# Patient Record
Sex: FEMALE | Race: WHITE | Employment: OTHER | URBAN - METROPOLITAN AREA
[De-identification: names, ages, dates, MRNs, and addresses within clinical notes are randomized per-mention and may not be internally consistent; named-entity substitution may affect disease eponyms.]

---

## 2018-05-02 ENCOUNTER — NURSING HOME VISIT (OUTPATIENT)
Dept: GERIATRICS | Facility: CLINIC | Age: 83
End: 2018-05-02
Payer: COMMERCIAL

## 2018-05-02 VITALS
SYSTOLIC BLOOD PRESSURE: 153 MMHG | BODY MASS INDEX: 31.32 KG/M2 | OXYGEN SATURATION: 94 % | TEMPERATURE: 99.1 F | RESPIRATION RATE: 18 BRPM | WEIGHT: 170.2 LBS | HEIGHT: 62 IN | HEART RATE: 85 BPM | DIASTOLIC BLOOD PRESSURE: 81 MMHG

## 2018-05-02 DIAGNOSIS — D62 ACUTE BLOOD LOSS ANEMIA: ICD-10-CM

## 2018-05-02 DIAGNOSIS — I26.99 OTHER PULMONARY EMBOLISM WITHOUT ACUTE COR PULMONALE, UNSPECIFIED CHRONICITY (H): ICD-10-CM

## 2018-05-02 DIAGNOSIS — S12.601D CLOSED NONDISPLACED FRACTURE OF SEVENTH CERVICAL VERTEBRA WITH ROUTINE HEALING, UNSPECIFIED FRACTURE MORPHOLOGY, SUBSEQUENT ENCOUNTER: Primary | ICD-10-CM

## 2018-05-02 DIAGNOSIS — S62.102D CLOSED FRACTURE OF LEFT WRIST WITH ROUTINE HEALING, SUBSEQUENT ENCOUNTER: ICD-10-CM

## 2018-05-02 DIAGNOSIS — I10 ESSENTIAL HYPERTENSION: ICD-10-CM

## 2018-05-02 DIAGNOSIS — S70.02XD HEMATOMA OF LEFT HIP, SUBSEQUENT ENCOUNTER: ICD-10-CM

## 2018-05-02 DIAGNOSIS — Z79.01 ANTICOAGULATION MANAGEMENT ENCOUNTER: ICD-10-CM

## 2018-05-02 DIAGNOSIS — R53.81 PHYSICAL DECONDITIONING: ICD-10-CM

## 2018-05-02 DIAGNOSIS — Z85.3 HX: BREAST CANCER: ICD-10-CM

## 2018-05-02 DIAGNOSIS — Z51.81 ANTICOAGULATION MANAGEMENT ENCOUNTER: ICD-10-CM

## 2018-05-02 PROBLEM — S70.02XA HIP HEMATOMA, LEFT: Status: ACTIVE | Noted: 2018-05-02

## 2018-05-02 PROBLEM — S62.102A CLOSED FRACTURE OF LEFT WRIST: Status: ACTIVE | Noted: 2018-05-02

## 2018-05-02 PROCEDURE — 99309 SBSQ NF CARE MODERATE MDM 30: CPT | Performed by: NURSE PRACTITIONER

## 2018-05-02 RX ORDER — SENNOSIDES 8.6 MG
2 TABLET ORAL 2 TIMES DAILY PRN
COMMUNITY

## 2018-05-02 NOTE — PROGRESS NOTES
[place] [facility]  PRIMARY CARE PROVIDER AND CLINIC:  [facility] [address] [facility]  Chief Complaint   Patient presents with     Hospital F/U     [place] Medical Record Number:  [id number]    HPI:    Christina Naqvi is a 86 year old  (5/16/1931),admitted to the [facility] TCU from [facility] .  Hospital stay 4/27/18 through 5/1/18.  Admitted to this facility for  rehab, medical management and nursing care.  HPI information obtained from: facility chart records, facility staff, patient report, [facility] chart review and Care Everywhere [facility] chart review.  Current issues are:      Closed nondisplaced fracture of seventh cervical vertebra with routine healing, unspecified fracture morphology, subsequent encounter  Closed fracture of left wrist with routine healing, subsequent encounter  Hematoma of left hip, subsequent encounter  Patient transferred to TCU from [facility] after a fall at home. Patient fell down her basement stairs. She was anticoagulated at the time of the fall. She did strike her head. HCT was negative for bleeding.  She sustained a C7 vertebral body fracture, head laceration and left wrist fracture. She was placed in a [place] J collar, which she should wear at all times and a short arm splint. She also sustained a large left hip hematoma.   She is walking with a cane. She has some pain but does not want anything stronger at this point.   Acute blood loss anemia  hgb dropped from 14 to 10.2.     Other pulmonary embolism without acute cor pulmonale, unspecified chronicity (H)  Anticoagulation management encounter  S/p two PULMONARY EMBOLI in the past: 2009.   Coumadin on hold until 5/11.  Hypertension  BP's: 153/81, 135/67, 151/82- no change in meds.   HX: breast cancer  Patient was diagnosed with a right breast infiltrating ductal carcinoma,III,ER+ [place] + H2N - breast cancer in 8/21/17. She had lumpectomy and SLN  bx. On arimidex at home.     Physical deconditioning- lives alone in house. Has children in the area.   Has been independent.       CODE STATUS/ADVANCE DIRECTIVES DISCUSSION:   CPR/Full code   Patient's living condition: lives alone    ALLERGIES:Codeine; Ampicillin; and Penicillins  PAST MEDICAL HISTORY:  has a past medical history of Antiplatelet or antithrombotic long-term use; Coronary artery disease; Diverticulitis; Diverticulosis of colon (without mention of haemorrhage); Endometriosis; Personal history of colonic polyps; Pulmonary emboli (H); Pure hypercholesterolemia; Unspecified essential hypertension; and Unspecified glaucoma.  PAST SURGICAL HISTORY:  has a past surgical history that includes appendectomy; hernia repair; GI surgery; ENT surgery; GYN surgery; Abdomen surgery; and Mohs micrographic procedure (7/2/2013).  FAMILY HISTORY: family history is not on file.  SOCIAL HISTORY:  reports that she has never smoked. She does not have any smokeless tobacco history on file. She reports that she drinks alcohol. She reports that she does not use illicit drugs.    Post Discharge Medication Reconciliation Status: discharge medications reconciled, continue medications without change.  Current Outpatient Prescriptions   Medication Sig Dispense Refill     ACETAMINOPHEN PO Take 650 mg by mouth 3 times daily       AMLODIPINE BESYLATE PO Take 5 mg by mouth daily.       ANASTROZOLE PO Take 1 mg by mouth daily       COENZYME Q-10 PO Take 200 mg by mouth daily        Ezetimibe (ZETIA PO) Take 10 mg by mouth At Bedtime.       LISINOPRIL PO Take 40 mg by mouth daily       OMEGA-3 FATTY ACIDS PO Take 1,200 mg by mouth daily       Psyllium (METAMUCIL FIBER PO) Take 1 packet by mouth daily       QUETIAPINE FUMARATE PO Take 12.5 mg by mouth nightly as needed.       ROSUVASTATIN CALCIUM PO Take 5 mg by mouth three times a week Monday, Wednesday, friday       sennosides (SENOKOT) 8.6 MG tablet Take 2 tablets by mouth 2 times  "daily as needed for constipation         ROS:  4 point ROS including Respiratory, CV, GI and , other than that noted in the HPI,  is negative    Exam:  /81  Pulse 85  Temp 99.1  F (37.3  C)  Resp 18  Ht 5' 2\" (1.575 m)  Wt 170 lb 3.2 oz (77.2 kg)  SpO2 94%  BMI 31.13 kg/m2  GENERAL APPEARANCE:  Alert, in no distress  ENT:  Mouth and posterior oropharynx normal, moist mucous membranes, hearing acuity adequate   EYES:  EOM, conjunctivae, lids, pupils and irises normal  NECK:  No adenopathy,masses or thyromegaly  RESP:  respiratory effort and palpation of chest normal, no respiratory distress, Lung sounds clear  CV:  Palpation and auscultation of heart done , rate and rhythm reg, no murmur, no rub or gallop, Edema none  ABDOMEN:  normal bowel sounds, soft, nontender, no hepatosplenomegaly or other masses  M/S:   Gait and station antalgic. Left arm in splint., Digits and nails normal   SKIN:  Inspection/Palpation of skin and subcutaneous tissue ecchymosis over left hip to thigh.   NEURO: 2-12 in normal limits and at patient's baseline  PSYCH:  insight and judgement, memory intact , affect and mood normal      Lab/Diagnostic data:  [facility] labs:  PROTIME-INR (05/01/2018 8:12 AM)  PROTIME-INR (05/01/2018 8:12 AM)   Component Value Ref Range   INR 1.5 (H) <1.3   PROTIME 18.1 (H) 11.7 - 14.1 sec     Hemoglobin AM (05/01/2018 8:12 AM)  Hemoglobin AM (05/01/2018 8:12 AM)   Component Value Ref Range   HEMOGLOBIN  10.4 (L) 12.0 - 16.0 g/dL   MCV  95 80 - 100 fL     Basic 8 (04/27/2018 5:46 PM)  Basic 8 (04/27/2018 5:46 PM)   Component Value Ref Range   SODIUM 139 135 - 145 mmol/L   POTASSIUM 3.7 3.5 - 5.0 mmol/L   CHLORIDE 105 98 - 110 mmol/L   CO2,TOTAL 21 21 - 31 mmol/L   ANION GAP 13 5 - 18    GLUCOSE 117 (H) 65 - 100 mg/dL   CALCIUM 9.5 8.5 - 10.5 mg/dL   BUN 18 8 - 25 mg/dL   CREATININE 0.99 0.57 - 1.11 mg/dL   BUN/CREAT RATIO  18 10 - 20    GFR if African American >60 >60 ml/min/1.73m2   GFR if not  " American 53 (L) >60 ml/min/1.73m2     CBC (04/27/2018 5:46 PM)  CBC (04/27/2018 5:46 PM)   Component Value Ref Range   WHITE BLOOD COUNT  11.1 (H) 4.5 - 11.0 thou/cu mm   RED BLOOD COUNT  4.81 4.00 - 5.20 mil/cu mm   HEMOGLOBIN  14.8 12.0 - 16.0 g/dL   HEMATOCRIT  44.2 33.0 - 51.0 %   MCV  92 80 - 100 fL   MCH  30.8 26.0 - 34.0 pg   MCHC  33.5 32.0 - 36.0 g/dL   RDW  15.2 11.5 - 15.5 %   PLATELET COUNT  328 140 - 440 thou/cu mm   MPV  9.1 6.5 - 11.0 fL         ASSESSMENT/PLAN:  (S12.895R) Closed nondisplaced fracture of seventh cervical vertebra with routine healing, unspecified fracture morphology, subsequent encounter  (primary encounter diagnosis)  Comment: sustained after fall down steps. Some pain. Wearing [place] J collar.   Plan: acetaminophen for pain, physical therapy   Activity restrictions are:  1. Wear [place]-j collar at all times, except when eating or showering.   2. No weight bearing of the left upper extremity   3. Wear the splint on the left wrist, do not remove it, completely cover the splint when showering.    [facility]: Appointment with Dr Prashant Bains scheduled for 5/24/18.  (L76.926O) Closed fracture of left wrist with routine healing, subsequent encounter  Comment: non surgical repair. Splint in place. NWF to left wrist   Plan: follow up with ortho in one week. to assess healing and obtain x-rays to assess fracture site   [facility] Date: Thur May 10th Time:1015am Location:[address] [facility] [address] Transport: Daughter    (S70.02XD) Hematoma of left hip, subsequent encounter  Comment: due to being anticoagulated at time of fall. She does have pain. She is able to bear wt.   Plan: physical therapy     (D62) Acute blood loss anemia  Comment: due to hematoma on hip.   Plan: CBC 5/3    (I26.99) Other pulmonary embolism without acute cor pulmonale, unspecified chronicity (H)  (Z51.81,  Z79.01) Anticoagulation management encounter  Comment: warfarin on hold until 5/11  Plan: restart  coumadin on 5/11    (I10) Essential hypertension  Comment: adequate control. No recent change in meds  Plan: BMP 5/4    (Z85.3) HX: breast cancer  Comment: s/p lumpectomy.   Plan: continue anastrozole    (R53.81) Physical deconditioning  Comment: lives alone in house. Fell down basement stairs. She has been independent.   Plan: physical therapy and OCCUPATIONAL THERAPY.     Orders:  Activity restrictions are:  1. Wear [place]-j collar at all times, except when eating or showering.   2. No weight bearing of the left upper extremity   3. Wear the splint on the left wrist, do not remove it, completely cover the splint when showering.   Follow up recommendations:  1. [facility]: Appointment with Dr Prashant Bains scheduled for 5/24/18.   2. [facility] Patient needs to schedule a follow up appointment in 1 week to assess healing and obtain x-rays to assess fracture site with the .   3. PCP within 5 days of discharge for follow up on stability of hematoma, Hgb. Recommending hold warfarin for additional 10 days (? Start around 5/10/18) and then need to monitor for re-bleed. This start date may need to be adjusted if orthopedic surgery has plans for surgical repair of wrist in near future. PTA - INR goal: 2.0-3.0 (goal 2.5).  Follow-up with orthopedic surgery 1 week after discharge for new radiographs of the left wrist to ensure the fracture displacement has not occurred. If the fracture remains nondisplaced, consideration can be given at that time as an outpatient for short arm casting. She should be nonweightbearing on her left wrist.      Electronically signed by:  CECY Dobbins CNP

## 2018-05-02 NOTE — LETTER
5/2/2018        RE: Christina Naqvi  [address] [facility] [address]        [place] [facility]  PRIMARY CARE PROVIDER AND CLINIC:  [facility] [address] [facility]  Chief Complaint   Patient presents with     Hospital F/U     [place] Medical Record Number:  [id number]    HPI:    Christina Naqvi is a 86 year old  (5/16/1931),admitted to the [facility] from [facility] .  Hospital stay 4/27/18 through 5/1/18.  Admitted to this facility for  rehab, medical management and nursing care.  HPI information obtained from: facility chart records, facility staff, patient report, [facility] chart review and Care Everywhere [facility] chart review.  Current issues are:      Closed nondisplaced fracture of seventh cervical vertebra with routine healing, unspecified fracture morphology, subsequent encounter  Closed fracture of left wrist with routine healing, subsequent encounter  Hematoma of left hip, subsequent encounter  Patient transferred to TCU from [facility] after a fall at home. Patient fell down her basement stairs. She was anticoagulated at the time of the fall. She did strike her head. HCT was negative for bleeding.  She sustained a C7 vertebral body fracture, head laceration and left wrist fracture. She was placed in a [place] J collar, which she should wear at all times and a short arm splint. She also sustained a large left hip hematoma.   She is walking with a cane. She has some pain but does not want anything stronger at this point.   Acute blood loss anemia  hgb dropped from 14 to 10.2.     Other pulmonary embolism without acute cor pulmonale, unspecified chronicity (H)  Anticoagulation management encounter  S/p two PULMONARY EMBOLI in the past: 2009.   Coumadin on hold until 5/11.  Hypertension  BP's: 153/81, 135/67, 151/82- no change in meds.   HX: breast cancer  Patient was diagnosed with a right  breast infiltrating ductal carcinoma,III,ER+ [place] + H2N - breast cancer in 8/21/17. She had lumpectomy and SLN bx. On arimidex at home.     Physical deconditioning- lives alone in house. Has children in the area.   Has been independent.       CODE STATUS/ADVANCE DIRECTIVES DISCUSSION:   CPR/Full code   Patient's living condition: lives alone    ALLERGIES:Codeine; Ampicillin; and Penicillins  PAST MEDICAL HISTORY:  has a past medical history of Antiplatelet or antithrombotic long-term use; Coronary artery disease; Diverticulitis; Diverticulosis of colon (without mention of haemorrhage); Endometriosis; Personal history of colonic polyps; Pulmonary emboli (H); Pure hypercholesterolemia; Unspecified essential hypertension; and Unspecified glaucoma.  PAST SURGICAL HISTORY:  has a past surgical history that includes appendectomy; hernia repair; GI surgery; ENT surgery; GYN surgery; Abdomen surgery; and Mohs micrographic procedure (7/2/2013).  FAMILY HISTORY: family history is not on file.  SOCIAL HISTORY:  reports that she has never smoked. She does not have any smokeless tobacco history on file. She reports that she drinks alcohol. She reports that she does not use illicit drugs.    Post Discharge Medication Reconciliation Status: discharge medications reconciled, continue medications without change.  Current Outpatient Prescriptions   Medication Sig Dispense Refill     ACETAMINOPHEN PO Take 650 mg by mouth 3 times daily       AMLODIPINE BESYLATE PO Take 5 mg by mouth daily.       ANASTROZOLE PO Take 1 mg by mouth daily       COENZYME Q-10 PO Take 200 mg by mouth daily        Ezetimibe (ZETIA PO) Take 10 mg by mouth At Bedtime.       LISINOPRIL PO Take 40 mg by mouth daily       OMEGA-3 FATTY ACIDS PO Take 1,200 mg by mouth daily       Psyllium (METAMUCIL FIBER PO) Take 1 packet by mouth daily       QUETIAPINE FUMARATE PO Take 12.5 mg by mouth nightly as needed.       ROSUVASTATIN CALCIUM PO Take 5 mg by mouth three times  "a week Monday, Wednesday, friday       sennosides (SENOKOT) 8.6 MG tablet Take 2 tablets by mouth 2 times daily as needed for constipation         ROS:  4 point ROS including Respiratory, CV, GI and , other than that noted in the HPI,  is negative    Exam:  /81  Pulse 85  Temp 99.1  F (37.3  C)  Resp 18  Ht 5' 2\" (1.575 m)  Wt 170 lb 3.2 oz (77.2 kg)  SpO2 94%  BMI 31.13 kg/m2  GENERAL APPEARANCE:  Alert, in no distress  ENT:  Mouth and posterior oropharynx normal, moist mucous membranes, hearing acuity adequate   EYES:  EOM, conjunctivae, lids, pupils and irises normal  NECK:  No adenopathy,masses or thyromegaly  RESP:  respiratory effort and palpation of chest normal, no respiratory distress, Lung sounds clear  CV:  Palpation and auscultation of heart done , rate and rhythm reg, no murmur, no rub or gallop, Edema none  ABDOMEN:  normal bowel sounds, soft, nontender, no hepatosplenomegaly or other masses  M/S:   Gait and station antalgic. Left arm in splint., Digits and nails normal   SKIN:  Inspection/Palpation of skin and subcutaneous tissue ecchymosis over left hip to thigh.   NEURO: 2-12 in normal limits and at patient's baseline  PSYCH:  insight and judgement, memory intact , affect and mood normal      Lab/Diagnostic data:  [facility] labs:  PROTIME-INR (05/01/2018 8:12 AM)  PROTIME-INR (05/01/2018 8:12 AM)   Component Value Ref Range   INR 1.5 (H) <1.3   PROTIME 18.1 (H) 11.7 - 14.1 sec     Hemoglobin AM (05/01/2018 8:12 AM)  Hemoglobin AM (05/01/2018 8:12 AM)   Component Value Ref Range   HEMOGLOBIN  10.4 (L) 12.0 - 16.0 g/dL   MCV  95 80 - 100 fL     Basic 8 (04/27/2018 5:46 PM)  Basic 8 (04/27/2018 5:46 PM)   Component Value Ref Range   SODIUM 139 135 - 145 mmol/L   POTASSIUM 3.7 3.5 - 5.0 mmol/L   CHLORIDE 105 98 - 110 mmol/L   CO2,TOTAL 21 21 - 31 mmol/L   ANION GAP 13 5 - 18    GLUCOSE 117 (H) 65 - 100 mg/dL   CALCIUM 9.5 8.5 - 10.5 mg/dL   BUN 18 8 - 25 mg/dL   CREATININE 0.99 0.57 - 1.11 " mg/dL   BUN/CREAT RATIO  18 10 - 20    GFR if African American >60 >60 ml/min/1.73m2   GFR if not  53 (L) >60 ml/min/1.73m2     CBC (04/27/2018 5:46 PM)  CBC (04/27/2018 5:46 PM)   Component Value Ref Range   WHITE BLOOD COUNT  11.1 (H) 4.5 - 11.0 thou/cu mm   RED BLOOD COUNT  4.81 4.00 - 5.20 mil/cu mm   HEMOGLOBIN  14.8 12.0 - 16.0 g/dL   HEMATOCRIT  44.2 33.0 - 51.0 %   MCV  92 80 - 100 fL   MCH  30.8 26.0 - 34.0 pg   MCHC  33.5 32.0 - 36.0 g/dL   RDW  15.2 11.5 - 15.5 %   PLATELET COUNT  328 140 - 440 thou/cu mm   MPV  9.1 6.5 - 11.0 fL         ASSESSMENT/PLAN:  (S12.816M) Closed nondisplaced fracture of seventh cervical vertebra with routine healing, unspecified fracture morphology, subsequent encounter  (primary encounter diagnosis)  Comment: sustained after fall down steps. Some pain. Wearing [place] J collar.   Plan: acetaminophen for pain, physical therapy   Activity restrictions are:  1. Wear [place]-j collar at all times, except when eating or showering.   2. No weight bearing of the left upper extremity   3. Wear the splint on the left wrist, do not remove it, completely cover the splint when showering.    [facility]: Appointment with Dr Prashant Bains scheduled for 5/24/18.  (A24.632T) Closed fracture of left wrist with routine healing, subsequent encounter  Comment: non surgical repair. Splint in place. NWF to left wrist   Plan: follow up with ortho in one week. to assess healing and obtain x-rays to assess fracture site   [facility] Date: Thur May 10th Time:1015am Location:[address] [facility] [address] Transport: Daughter    (S70.02XD) Hematoma of left hip, subsequent encounter  Comment: due to being anticoagulated at time of fall. She does have pain. She is able to bear wt.   Plan: physical therapy     (D62) Acute blood loss anemia  Comment: due to hematoma on hip.   Plan: CBC 5/3    (I26.99) Other pulmonary embolism without acute cor pulmonale, unspecified chronicity  (H)  (Z51.81,  Z79.01) Anticoagulation management encounter  Comment: warfarin on hold until 5/11  Plan: restart coumadin on 5/11    (I10) Essential hypertension  Comment: adequate control. No recent change in meds  Plan: BMP 5/4    (Z85.3) HX: breast cancer  Comment: s/p lumpectomy.   Plan: continue anastrozole    (R53.81) Physical deconditioning  Comment: lives alone in house. Fell down basement stairs. She has been independent.   Plan: physical therapy and OCCUPATIONAL THERAPY.     Orders:  Activity restrictions are:  1. Wear [place]-j collar at all times, except when eating or showering.   2. No weight bearing of the left upper extremity   3. Wear the splint on the left wrist, do not remove it, completely cover the splint when showering.   Follow up recommendations:  1. [facility]: Appointment with Dr Prashant Bains scheduled for 5/24/18.   2. [facility] Patient needs to schedule a follow up appointment in 1 week to assess healing and obtain x-rays to assess fracture site with the .   3. PCP within 5 days of discharge for follow up on stability of hematoma, Hgb. Recommending hold warfarin for additional 10 days (? Start around 5/10/18) and then need to monitor for re-bleed. This start date may need to be adjusted if orthopedic surgery has plans for surgical repair of wrist in near future. PTA - INR goal: 2.0-3.0 (goal 2.5).  Follow-up with orthopedic surgery 1 week after discharge for new radiographs of the left wrist to ensure the fracture displacement has not occurred. If the fracture remains nondisplaced, consideration can be given at that time as an outpatient for short arm casting. She should be nonweightbearing on her left wrist.      Electronically signed by:  CECY Dobbins CNP                    Sincerely,        CECY Dobbins CNP

## 2018-05-04 ENCOUNTER — NURSING HOME VISIT (OUTPATIENT)
Dept: GERIATRICS | Facility: CLINIC | Age: 83
End: 2018-05-04
Payer: COMMERCIAL

## 2018-05-04 ENCOUNTER — TRANSFERRED RECORDS (OUTPATIENT)
Dept: HEALTH INFORMATION MANAGEMENT | Facility: CLINIC | Age: 83
End: 2018-05-04

## 2018-05-04 VITALS
OXYGEN SATURATION: 96 % | HEIGHT: 62 IN | WEIGHT: 165.6 LBS | DIASTOLIC BLOOD PRESSURE: 84 MMHG | SYSTOLIC BLOOD PRESSURE: 158 MMHG | HEART RATE: 96 BPM | TEMPERATURE: 99 F | RESPIRATION RATE: 20 BRPM | BODY MASS INDEX: 30.47 KG/M2

## 2018-05-04 DIAGNOSIS — I26.99 OTHER PULMONARY EMBOLISM WITHOUT ACUTE COR PULMONALE, UNSPECIFIED CHRONICITY (H): ICD-10-CM

## 2018-05-04 DIAGNOSIS — R53.81 PHYSICAL DECONDITIONING: Primary | ICD-10-CM

## 2018-05-04 DIAGNOSIS — S12.601D CLOSED NONDISPLACED FRACTURE OF SEVENTH CERVICAL VERTEBRA WITH ROUTINE HEALING, UNSPECIFIED FRACTURE MORPHOLOGY, SUBSEQUENT ENCOUNTER: ICD-10-CM

## 2018-05-04 DIAGNOSIS — S70.02XD HEMATOMA OF LEFT HIP, SUBSEQUENT ENCOUNTER: ICD-10-CM

## 2018-05-04 DIAGNOSIS — I10 ESSENTIAL HYPERTENSION: ICD-10-CM

## 2018-05-04 DIAGNOSIS — S62.102D CLOSED FRACTURE OF LEFT WRIST WITH ROUTINE HEALING, SUBSEQUENT ENCOUNTER: ICD-10-CM

## 2018-05-04 DIAGNOSIS — Z85.3 HX: BREAST CANCER: ICD-10-CM

## 2018-05-04 LAB
ANION GAP SERPL CALCULATED.3IONS-SCNC: 10 MMOL/L (ref 3–14)
BUN SERPL-MCNC: 17 MG/DL (ref 7–30)
CALCIUM SERPL-MCNC: 8.6 MG/DL (ref 8.5–10.1)
CHLORIDE SERPLBLD-SCNC: 102 MMOL/L (ref 94–109)
CO2 SERPL-SCNC: 23 MMOL/L (ref 20–32)
CREAT SERPL-MCNC: 0.93 MG/DL (ref 0.52–1.04)
ERYTHROCYTE [DISTWIDTH] IN BLOOD BY AUTOMATED COUNT: 15.6 % (ref 10–15)
GFR SERPL CREATININE-BSD FRML MDRD: 57 ML/MIN/1.73M2
GLUCOSE SERPL-MCNC: 104 MG/DL (ref 70–99)
HCT VFR BLD AUTO: 32.8 % (ref 35–47)
HEMOGLOBIN: 10.9 G/DL (ref 11.7–15.7)
MCH RBC QN AUTO: 30.6 PG (ref 26.5–33)
MCHC RBC AUTO-ENTMCNC: 33.2 G/DL (ref 31.5–36.5)
MCV RBC AUTO: 92 FL (ref 78–100)
PLATELET # BLD AUTO: 366 10E9/L (ref 150–450)
POTASSIUM SERPL-SCNC: 3.9 MMOL/L (ref 3.4–5.3)
RBC # BLD AUTO: 3.56 10E12/L (ref 3.8–5.2)
SODIUM SERPL-SCNC: 135 MMOL/L (ref 133–144)
WBC # BLD AUTO: 8.5 10E9/L (ref 4–11)

## 2018-05-04 PROCEDURE — 99305 1ST NF CARE MODERATE MDM 35: CPT | Performed by: INTERNAL MEDICINE

## 2018-05-04 NOTE — PROGRESS NOTES
PRIMARY CARE PROVIDER AND CLINIC RESPONSIBLE:  Amelia Hurtado, [facility] [address] [facility] / [facility]*        ADMISSION HISTORY AND PHYSICAL EXAMINATION     Chief Complaint   Patient presents with     Fatigue         HISTORY OF PRESENT ILLNESS:  86 year old female, (5/16/1931), admitted to the [facility] for continuation of medical care and rehab.  HPI information obtained from: facility chart records, facility staff, patient report, [facility] chart review and Care Everywhere [facility] chart review.    Christina Naqvi is a 86 year old female with history of pulmonary embolism in 2009, attention on history of breast cancer due to right breast infiltrating ductal carcinoma status post lumpectomy who was admitted at [facility] from 4/27 to 5/1/18 due to mechanical fall.  Patient fell down her basement stairs and she did strike her head to the floor.  She was transported to [facility].  Her CT scan of the head was negative.  Initially her CT cervical spine was negative but subsequent MRI of cervical spine showed C7 vertebral body fracture.  She was placed with [place] J collar to wear all the time her x-ray of the left wrist showed left nondisplaced distal radius fracture and short splint was placed.  Her laceration which was repaired.  She also sustained a large hematoma left hip.  She has pain in her left hip with movements.  She had blood loss anemia her hemoglobin dropped to 10.2.  She is able to walk with a walker.  She denies any headaches, dizziness, lightheadedness, vision change, hearing change, chest pains, dyspnea, abdominal pain, fever and dysuria.  The rest of review systems unremarkable Slept well, appetite good and had BM. Patient is seen and examined by me with  Susu Yoder NP. Please see JESSICA Yoder's admit noted dated 5/2/18 for details of admission, past medical history, family history, allergies, medication list, social history and other  details pertinent with this admission. Hospital admission and dc summary reviewed.      Past Medical History:   Diagnosis Date     Antiplatelet or antithrombotic long-term use     has been on periodically for pulm emboli     Coronary artery disease      Diverticulitis      Diverticulosis of colon (without mention of haemorrhage)      Endometriosis      Personal history of colonic polyps      Pulmonary emboli (H)          Pure hypercholesterolemia      Unspecified essential hypertension      Unspecified glaucoma        Past Surgical History:   Procedure Laterality Date     ABDOMEN SURGERY      splenectomy, exploratory laparoscopy     APPENDECTOMY       ENT SURGERY      tonsillectomy     GI SURGERY      diverticulosis abscess s/p resection, drain sigmoid diverticular abscess, colectomy     GYN SURGERY       x 2, partial hysterectomy     HERNIA REPAIR       MOHS MICROGRAPHIC PROCEDURE  2013    Procedure: MOHS MICROGRAPHIC PROCEDURE;  RIGHT UPPER LID MOHS CLOSURE ;  Surgeon: Efrem Rushing MD;  Location: [facility]       Current Outpatient Prescriptions   Medication Sig     ACETAMINOPHEN PO Take 650 mg by mouth 3 times daily     AMLODIPINE BESYLATE PO Take 5 mg by mouth daily.     ANASTROZOLE PO Take 1 mg by mouth daily     COENZYME Q-10 PO Take 200 mg by mouth daily      Ezetimibe (ZETIA PO) Take 10 mg by mouth At Bedtime.     LISINOPRIL PO Take 40 mg by mouth daily     OMEGA-3 FATTY ACIDS PO Take 1,200 mg by mouth daily     Psyllium (METAMUCIL FIBER PO) Take 1 packet by mouth daily     QUETIAPINE FUMARATE PO Take 12.5 mg by mouth nightly as needed.     ROSUVASTATIN CALCIUM PO Take 5 mg by mouth three times a week Monday, Wednesday, friday     sennosides (SENOKOT) 8.6 MG tablet Take 2 tablets by mouth 2 times daily as needed for constipation     No current facility-administered medications for this visit.        Allergies   Allergen Reactions     Codeine Nausea     Ampicillin Rash     Penicillins  "Rash       Social History     Social History     Marital status: Single     Spouse name: N/A     Number of children: N/A     Years of education: N/A     Occupational History     Not on file.     Social History Main Topics     Smoking status: Never Smoker     Smokeless tobacco: Not on file     Alcohol use Yes      Comment: 1 glass of wine/ week     Drug use: No     Sexual activity: Not on file     Other Topics Concern     Not on file     Social History Narrative     No narrative on file          Information reviewed:  Medications, vital signs, orders, nursing notes, problem list, hospital information.     ROS: All 10 point review of system completed, those pertinent positive, please see H&P, the remaining ROS is negative.    /84  Pulse 96  Temp 99  F (37.2  C)  Resp 20  Ht 5' 2\" (1.575 m)  Wt 165 lb 9.6 oz (75.1 kg)  SpO2 96%  BMI 30.29 kg/m2    PHYSICAL EXAMINATION:   GENERAL:  No acute distress.  SKIN:  Dry and warm.  There is no rash at area of skin examined.  HEENT:  Head without trauma.  Pupils round, reactive. Exam of conjunctiva and lids are normal. Sclera without icterus. There is no oral thrush.  NECK: Neck collar in place.  CHEST: No reproducible chest tenderness.   LUNGS:  Normal respiratory effort. Lungs reveal decreased breath sounds at bases. No rales or wheezes.  HEART:  Regular rate and rhythm.  No murmur, gallops or rubs auscultated.  ABDOMEN:  Soft, bowel sounds positive.  There is no tenderness or guarding.   EXTREMITIES: trace edema left. Left forearm and wrist cast in place. lft leg hematoma, tender.  NEUROLOGIC:  Alert and oriented x3.  There is no focal deficit appreciated.  PSYCH: Recent and remote memory is normal. Mood and affect are normal.    Lab/Diagnostic data:  Reviewed  [facility] labs:  PROTIME-INR (05/01/2018 8:12 AM)       PROTIME-INR (05/01/2018 8:12 AM)   Component Value Ref Range   INR 1.5 (H) <1.3   PROTIME 18.1 (H) 11.7 - 14.1 sec      Hemoglobin AM (05/01/2018 8:12 " AM)       Hemoglobin AM (05/01/2018 8:12 AM)   Component Value Ref Range   HEMOGLOBIN  10.4 (L) 12.0 - 16.0 g/dL   MCV  95 80 - 100 fL      Basic 8 (04/27/2018 5:46 PM)  Basic 8 (04/27/2018 5:46 PM)   Component Value Ref Range   SODIUM 139 135 - 145 mmol/L   POTASSIUM 3.7 3.5 - 5.0 mmol/L   CHLORIDE 105 98 - 110 mmol/L   CO2,TOTAL 21 21 - 31 mmol/L   ANION GAP 13 5 - 18    GLUCOSE 117 (H) 65 - 100 mg/dL   CALCIUM 9.5 8.5 - 10.5 mg/dL   BUN 18 8 - 25 mg/dL   CREATININE 0.99 0.57 - 1.11 mg/dL   BUN/CREAT RATIO  18 10 - 20    GFR if African American >60 >60 ml/min/1.73m2   GFR if not  53 (L) >60 ml/min/1.73m2      CBC (04/27/2018 5:46 PM)       CBC (04/27/2018 5:46 PM)   Component Value Ref Range   WHITE BLOOD COUNT  11.1 (H) 4.5 - 11.0 thou/cu mm   RED BLOOD COUNT  4.81 4.00 - 5.20 mil/cu mm   HEMOGLOBIN  14.8 12.0 - 16.0 g/dL   HEMATOCRIT  44.2 33.0 - 51.0 %   MCV  92 80 - 100 fL   MCH  30.8 26.0 - 34.0 pg   MCHC  33.5 32.0 - 36.0 g/dL   RDW  15.2 11.5 - 15.5 %   PLATELET COUNT  328 140 - 440 thou/cu mm   MPV  9.1 6.5 - 11.0 fL      CT SPINE CERVICAL WO4/27/2018    MR SPINE CERVICAL WO4/27/2018  [facility]  Result Narrative   INDICATION:  Fall.     TECHNIQUE:  Multiplanar multi-weighted MRI of the cervical spine was performed without intravenous contrast using the standard cervical spine protocol.    COMPARISON:  None available.    FINDINGS:  There is straightening of the cervical spine. The C7 superior endplate fracture with very mild vertebral body height loss. There is abnormal STIR hyperintensity within the posterior ligamentous complexes of C5-6, C6-7, and C7-T1. The visualized brain and spinal cord are normal. There are multilevel degenerative changes, including moderate disc osteophyte complexes at C4-C5 and C5-C6 that result in moderate spinal canal stenosis at these levels. Additionally, there is severe spinal neural foraminal stenosis bilaterally at C3-C4,  C4-C5, and on the left at C5-C6 secondary to facet and uncovertebral arthropathy.     IMPRESSION:  1. C7 vertebral body fracture   2. Posterior ligamentous complex injury from C5-C6 through C7-T1.   3. Multilevel degenerative changes as described above.         [facility]  Result Narrative   INDICATION:  Scalp laceration status post fall while on Coumadin for pulmonary embolism.     Technique:  2.5-mm thick  axial sections were obtained through the cervical spine from the skull base to the thoracic inlet without intravenous or intrathecal contrast material. Data was reformatted in axial, sagittal and coronal planes.    CT at this Facility uses dose modulation, iterative reconstruction, and /or weight-based dosing, when appropriate, to reduce radiation dose to as low as reasonably achievable.      FINDINGS:  The normal cervical lordosis is straightened. Alignment is anatomic. There are chronic degenerative changes in the intervertebral discs, vertebral body endplates, uncovertebral joints and facet joints at multiple levels.     These changes produce multilevel spinal canal stenosis which is mild at the C2-3 level, moderate at the C3-4 level, mild to moderate at the C4-5 level, moderate at the C5-6 level and mild at the C6-7 level.     They also produce multilevel neural foraminal stenoses which are mild to moderate bilaterally at the C3-4 level, moderate to severe on the left at the C4-5 level, moderate to severe on the right at the C5-6 level and mild-to-moderate on the right at the C6-7 level. There might be impingement on the left C5 and right C6 nerve roots.     The bones are osteoporotic. There is no evidence for fracture, dislocation, malalignment,  lytic or blastic process.     The skull base and visualized contents of the skull are unremarkable. The visualized pulmonary apices and pleural spaces are clear. Incidental note is made of an azygos lobe in the upper right lung.  This is a normal variant.     IMPRESSION:  1. Osteoporosis.    2. Multilevel chronic spondylotic changes as detailed with potential impingement on the left C5 and right C6 nerve roots.      3.  no evidence for acute traumatic injury on this examination obtained with the patient supine and the neck in neutral position.    4.. If myelopathy is a clinical consideration, then MRI is recommended.    5.. If ligamentous injury is a clinical consideration, then lateral plain radiographs of the cervical spine in flexion and extension are recommended, once the patient is able to cooperate fully, usually 1 to 2 weeks following removal of the cervical collar.      XR WRIST 3 VIEWS LEFT4/27/2018  [facility]  Result Narrative   INDICATION:  Left wrist pain and injury.    TECHNIQUE:  Three view.    FINDINGS:  Nondisplaced fracture is identified through the distal left radius at the wrist. Intra-articular involvement appears to be present. No significant angulation is seen. Osteopenia noted. Degenerative change at the base of the 1st metacarpal is noted.    IMPRESSION:  Nondisplaced distal left radial fracture is identified. Osteopenia is noted. Degenerative change at the base of the 1st metacarpal is noted.       ASSESSMENT / PLAN:     Physical deconditioning  Plan: PT/OT with increase independence, self-care, strength and endurance and other cares that help return to home/facility of origin, fall precautions. Care conference with patient and family for the progress of rehab and disposition issues will be discussed as planned. Rehab evaluation and other evaluations including CPT are at rehab logs, to be reviewed separately.  Fall risk assessment as well as cognitive evaluation will be formed during rehab stay if indicated.    Closed nondisplaced fracture of seventh cervical vertebra with routine healing, unspecified fracture morphology, subsequent encounter, Closed fracture of left wrist with  routine healing, subsequent encounter and Hematoma of left hip, subsequent encounter  Plan: PT/OT, pain medication, neck collar all the time for neck fracture, short splint at left wrist. Follow up orthopedic and neurosurgery clinic as scheduled.  Bowel regimen as ordered    Essential hypertension  Plan: Continue lisinopril and Norvasc with parameters. Blood pressure mildly elevated due to pain, follow-up    Other pulmonary embolism without acute cor pulmonale, unspecified chronicity (H)  Plan: Continue to monitor closely.  No anticoagulation due to a large left hip hematoma.    HX: breast cancer  Plan: We will continue anastrozole.  Follow-up oncology clinic as scheduled.    Other problems with same care. Primary care doctor and other specialists to address those chronic problems in next clinic appointment to be scheduled upon discharge from the TCU.      Total time spent with patient visit was 35 min including patient visit, review of past records, patients counseling and coordinating care.        Jarrod Bell MD

## 2018-05-07 ENCOUNTER — NURSING HOME VISIT (OUTPATIENT)
Dept: GERIATRICS | Facility: CLINIC | Age: 83
End: 2018-05-07
Payer: COMMERCIAL

## 2018-05-07 VITALS
HEIGHT: 62 IN | RESPIRATION RATE: 16 BRPM | TEMPERATURE: 99.1 F | SYSTOLIC BLOOD PRESSURE: 153 MMHG | WEIGHT: 165.6 LBS | HEART RATE: 84 BPM | OXYGEN SATURATION: 95 % | BODY MASS INDEX: 30.47 KG/M2 | DIASTOLIC BLOOD PRESSURE: 90 MMHG

## 2018-05-07 DIAGNOSIS — S12.601D CLOSED NONDISPLACED FRACTURE OF SEVENTH CERVICAL VERTEBRA WITH ROUTINE HEALING, UNSPECIFIED FRACTURE MORPHOLOGY, SUBSEQUENT ENCOUNTER: ICD-10-CM

## 2018-05-07 DIAGNOSIS — S70.02XD HEMATOMA OF LEFT HIP, SUBSEQUENT ENCOUNTER: ICD-10-CM

## 2018-05-07 DIAGNOSIS — Z85.3 HX: BREAST CANCER: ICD-10-CM

## 2018-05-07 DIAGNOSIS — S62.102D CLOSED FRACTURE OF LEFT WRIST WITH ROUTINE HEALING, SUBSEQUENT ENCOUNTER: ICD-10-CM

## 2018-05-07 DIAGNOSIS — I10 ESSENTIAL HYPERTENSION: ICD-10-CM

## 2018-05-07 DIAGNOSIS — R07.2 PRECORDIAL PAIN: Primary | ICD-10-CM

## 2018-05-07 DIAGNOSIS — I26.99 OTHER PULMONARY EMBOLISM WITHOUT ACUTE COR PULMONALE, UNSPECIFIED CHRONICITY (H): ICD-10-CM

## 2018-05-07 DIAGNOSIS — R53.81 PHYSICAL DECONDITIONING: ICD-10-CM

## 2018-05-07 DIAGNOSIS — D62 ACUTE BLOOD LOSS ANEMIA: ICD-10-CM

## 2018-05-07 PROCEDURE — 99309 SBSQ NF CARE MODERATE MDM 30: CPT | Performed by: NURSE PRACTITIONER

## 2018-05-07 NOTE — PROGRESS NOTES
[facility]    Chief Complaint   Patient presents with     FERNANDA       [facility] Medical Record Number:  [id number]    HPI:    Christina Naqvi is a 86 year old  (5/16/1931), who is being seen today for an episodic care visit at [facility].  HPI information obtained from: facility chart records, facility staff, patient report, [facility] chart review and Care Everywhere [facility] chart review.  Current issues are:      Pre cordial pain- call from nurse regarding retro sternal pain that went through to her back. The pain started yesterday during the day. She had it when she went to therapy but she did not tell anyone. During the night, at 4am she felt the pain. She took some tylenol. By morning she felt better. She does not have the pain now. She denies any nausea, reflux, diaphoresis. She denies any pain radiating to jaw or left arm. She has no shortness of breath. She has not had this pain before. She does not have a cardiologist.   Records indicate episode of CP 3 years ago- ecg normal. She has had PULMONARY EMBOLISM and she was recently treated for Breast cancer.     Closed nondisplaced fracture of seventh cervical vertebra with routine healing, unspecified fracture morphology, subsequent encounter  Closed fracture of left wrist with routine healing, subsequent encounter  Hematoma of left hip, subsequent encounter  Patient transferred to TCU from [facility] after a fall at home. Patient fell down her basement stairs. She was anticoagulated at the time of the fall. She did strike her head. HCT was negative for bleeding.  She sustained a C7 vertebral body fracture, head laceration and left wrist fracture. She was placed in a [place] J collar, which she should wear at all times and a short arm splint. She also sustained a large left hip hematoma.   She is walking with a cane. She has a burning sensation at sight of left hip hematoma but does not want anything stronger at this point.     Acute blood  loss anemia  hgb dropped from 14 to 10.2.   Hemoglobin   Date Value Ref Range Status   05/04/2018 10.9 (A) 11.7 - 15.7 g/dL Final   ]  Other pulmonary embolism without acute cor pulmonale, unspecified chronicity (H)  Anticoagulation management encounter  S/p two PULMONARY EMBOLI in the past: 2009.   Coumadin on hold until 5/11.    Hypertension  BP's: 153/90, 148/86, 158/84- no change in meds. Nursing has been checking BP in her legs due to h/o mastectomy and lymph node removal on right and cast on left wrist.     HX: breast cancer  Patient was diagnosed with a right breast infiltrating ductal carcinoma,III,ER+ ME + H2N - breast cancer in 8/21/17. She had lumpectomy and SLN bx. On arimidex at home.     Physical deconditioning- lives alone in house. Has children in the area.   Has been independent. Therapy is going to work on car transfers today.     ALLERGIES: Codeine; Ampicillin; and Penicillins  Past Medical, Surgical, Family and Social History reviewed and updated in [facility].    Current Outpatient Prescriptions   Medication Sig Dispense Refill     ACETAMINOPHEN PO Take 650 mg by mouth 3 times daily       AMLODIPINE BESYLATE PO Take 5 mg by mouth daily.       ANASTROZOLE PO Take 1 mg by mouth daily       COENZYME Q-10 PO Take 200 mg by mouth daily        Ezetimibe (ZETIA PO) Take 10 mg by mouth At Bedtime.       LISINOPRIL PO Take 40 mg by mouth daily       OMEGA-3 FATTY ACIDS PO Take 1,200 mg by mouth daily       Psyllium (METAMUCIL FIBER PO) Take 1 packet by mouth daily       QUETIAPINE FUMARATE PO Take 12.5 mg by mouth nightly as needed.       ROSUVASTATIN CALCIUM PO Take 5 mg by mouth three times a week Monday, Wednesday, friday       sennosides (SENOKOT) 8.6 MG tablet Take 2 tablets by mouth 2 times daily as needed for constipation       Medications reviewed:  Medications reconciled to facility chart and changes were made to reflect current medications as identified as above med list. Below are the changes that  "were made:   Medications stopped since last EPIC medication reconciliation:   There are no discontinued medications.    Medications started since last [facility] medication reconciliation:  No orders of the defined types were placed in this encounter.          REVIEW OF SYSTEMS:  4 point ROS including Respiratory, CV, GI and , other than that noted in the HPI,  is negative    Physical Exam:  /90  Pulse 84  Temp 99.1  F (37.3  C)  Resp 16  Ht 5' 2\" (1.575 m)  Wt 165 lb 9.6 oz (75.1 kg)  SpO2 95%  BMI 30.29 kg/m2  GENERAL APPEARANCE:  Alert, in no distress  ENT:  Mouth and posterior oropharynx normal, moist mucous membranes, hearing acuity adequate   EYES:  EOM, conjunctivae, lids, pupils and irises normal  RESP:  respiratory effort and palpation of chest normal, no respiratory distress, Lung sounds clear  CV:  Palpation and auscultation of heart done , rate and rhythm reg, no murmur, no rub or gallop, Edema none  ABDOMEN:  normal bowel sounds, soft, nontender, no hepatosplenomegaly or other masses  M/S:   Gait and station antalgic. Left arm in splint. She is wearing [place] J collar, Digits and nails normal   SKIN:  Inspection/Palpation of skin and subcutaneous tissue ecchymosis over left hip to thigh.   NEURO: 2-12 in normal limits and at patient's baseline  PSYCH:  insight and judgement, memory intact , affect and mood normal    Recent Labs:   CBC RESULTS:   Recent Labs   Lab Test 05/04/18   WBC  8.5   RBC  3.56*   HGB  10.9*   HCT  32.8*   MCV  92   MCH  30.6   MCHC  33.2   RDW  15.6*   PLT  366       Last Basic Metabolic Panel:  Recent Labs   Lab Test 05/04/18   NA  135   POTASSIUM  3.9   CHLORIDE  102   MARQUEZ  8.6   CO2  23   BUN  17   CR  0.93   GLC  104*   2/6/18- Chest CT- follow up on pulmonary nodule   IMPRESSION:  1. Subtle enlargement of the now 9 x 6 mm right middle lobe pulmonary nodule.  2. Likely post infectious or post inflammatory new scar or chronic atelectasis right upper lobe inferiorly " abutting the pleural margin across the minor fissure.    Assessment/Plan:  (R07.2) Precordial pain  (primary encounter diagnosis)  Comment: episode of substernal chest pain yesterday that lasted for many hours. She did manage to go to sleep but woke up at 4:30 am. She took some acetaminophen and went back to sleep. This am she feel fine. VSS.  No dyspnea, no N, or reflux. DDX: stress vs angina vs pulmonary issue,   Plan: will watch for now. If more sx then might send to ED for evaluation.     (S12.361D) Closed nondisplaced fracture of seventh cervical vertebra with routine healing, unspecified fracture morphology, subsequent encounter  Comment: sustained after fall down steps. Wearing [place] J collar.   Plan: acetaminophen for pain, physical therapy   Activity restrictions are:  1. Wear [place]-j collar at all times, except when eating or showering.   2. No weight bearing of the left upper extremity   3. Wear the splint on the left wrist, do not remove it, completely cover the splint when showering.   [facility]: Appointment with Dr Prashant Bains scheduled for 5/24/18.    (A19.353D) Closed fracture of left wrist with routine healing, subsequent encounter  Comment: non surgical repair. Splint in place. NWF to left wrist   Plan: follow up with ortho in one week. to assess healing and obtain x-rays to assess fracture site   [facility] Date: Thur May 10th Time:1015am Location:[address]     (S70.02XD) Hematoma of left hip, subsequent encounter  Comment: due to being anticoagulated at time of fall. She does have burning pain. She is able to bear wt.   Plan: physical therapy, acetaminphen    (D62) Acute blood loss anemia  Comment: due to hematoma on hip. hgb stable at 10s.   Plan: hgb next week.     (I26.99) Other pulmonary embolism without acute cor pulmonale, unspecified chronicity (H)  Comment: warfarin on hold   Plan: restart warfarin on 5/11    (I10) Essential hypertension  Comment:  nursing has been checking BPs in leg and readings have been elevated. When checked in left arm then BP normal   Plan: use left arm for BPS    (Z85.3) HX: breast cancer  Comment: recent lumpectomy and sentinal node biopsy  Plan: continue plan of care    (R53.81) Physical deconditioning  Comment: due to fall with fractures.   Plan: physical therapy and OCCUPATIONAL THERAPY         Electronically signed by  CECY Dobbins CNP

## 2018-05-07 NOTE — LETTER
5/7/2018        RE: Christina Naqvi  [address] [facility] [address]        [place] [facility]    Chief Complaint   Patient presents with     FERNANDA       [place] Medical Record Number:  [id number]    HPI:    Christina Naqvi is a 86 year old  (5/16/1931), who is being seen today for an episodic care visit at [facility].  HPI information obtained from: facility chart records, facility staff, patient report, [facility] chart review and Care Everywhere [facility] chart review.  Current issues are:      Pre cordial pain- call from nurse regarding retro sternal pain that went through to her back. The pain started yesterday during the day. She had it when she went to therapy but she did not tell anyone. During the night, at 4am she felt the pain. She took some tylenol. By morning she felt better. She does not have the pain now. She denies any nausea, reflux, diaphoresis. She denies any pain radiating to jaw or left arm. She has no shortness of breath. She has not had this pain before. She does not have a cardiologist.   Records indicate episode of CP 3 years ago- ecg normal. She has had PULMONARY EMBOLISM and she was recently treated for Breast cancer.     Closed nondisplaced fracture of seventh cervical vertebra with routine healing, unspecified fracture morphology, subsequent encounter  Closed fracture of left wrist with routine healing, subsequent encounter  Hematoma of left hip, subsequent encounter  Patient transferred to TCU from [facility] after a fall at home. Patient fell down her basement stairs. She was anticoagulated at the time of the fall. She did strike her head. HCT was negative for bleeding.  She sustained a C7 vertebral body fracture, head laceration and left wrist fracture. She was placed in a [place] J collar, which she should wear at all times and a short arm splint. She also sustained a large left hip hematoma.   She is walking with a cane. She has a burning  sensation at sight of left hip hematoma but does not want anything stronger at this point.     Acute blood loss anemia  hgb dropped from 14 to 10.2.   Hemoglobin   Date Value Ref Range Status   05/04/2018 10.9 (A) 11.7 - 15.7 g/dL Final   ]  Other pulmonary embolism without acute cor pulmonale, unspecified chronicity (H)  Anticoagulation management encounter  S/p two PULMONARY EMBOLI in the past: 2009.   Coumadin on hold until 5/11.    Hypertension  BP's: 153/90, 148/86, 158/84- no change in meds. Nursing has been checking BP in her legs due to h/o mastectomy and lymph node removal on right and cast on left wrist.     HX: breast cancer  Patient was diagnosed with a right breast infiltrating ductal carcinoma,III,ER+ NH + H2N - breast cancer in 8/21/17. She had lumpectomy and SLN bx. On arimidex at home.     Physical deconditioning- lives alone in house. Has children in the area.   Has been independent. Therapy is going to work on car transfers today.     ALLERGIES: Codeine; Ampicillin; and Penicillins  Past Medical, Surgical, Family and Social History reviewed and updated in [place].    Current Outpatient Prescriptions   Medication Sig Dispense Refill     ACETAMINOPHEN PO Take 650 mg by mouth 3 times daily       AMLODIPINE BESYLATE PO Take 5 mg by mouth daily.       ANASTROZOLE PO Take 1 mg by mouth daily       COENZYME Q-10 PO Take 200 mg by mouth daily        Ezetimibe (ZETIA PO) Take 10 mg by mouth At Bedtime.       LISINOPRIL PO Take 40 mg by mouth daily       OMEGA-3 FATTY ACIDS PO Take 1,200 mg by mouth daily       Psyllium (METAMUCIL FIBER PO) Take 1 packet by mouth daily       QUETIAPINE FUMARATE PO Take 12.5 mg by mouth nightly as needed.       ROSUVASTATIN CALCIUM PO Take 5 mg by mouth three times a week Monday, Wednesday, friday       sennosides (SENOKOT) 8.6 MG tablet Take 2 tablets by mouth 2 times daily as needed for constipation       Medications reviewed:  Medications reconciled to facility chart and  "changes were made to reflect current medications as identified as above med list. Below are the changes that were made:   Medications stopped since last EPIC medication reconciliation:   There are no discontinued medications.    Medications started since last [facility] medication reconciliation:  No orders of the defined types were placed in this encounter.          REVIEW OF SYSTEMS:  4 point ROS including Respiratory, CV, GI and , other than that noted in the HPI,  is negative    Physical Exam:  /90  Pulse 84  Temp 99.1  F (37.3  C)  Resp 16  Ht 5' 2\" (1.575 m)  Wt 165 lb 9.6 oz (75.1 kg)  SpO2 95%  BMI 30.29 kg/m2  GENERAL APPEARANCE:  Alert, in no distress  ENT:  Mouth and posterior oropharynx normal, moist mucous membranes, hearing acuity adequate   EYES:  EOM, conjunctivae, lids, pupils and irises normal  RESP:  respiratory effort and palpation of chest normal, no respiratory distress, Lung sounds clear  CV:  Palpation and auscultation of heart done , rate and rhythm reg, no murmur, no rub or gallop, Edema none  ABDOMEN:  normal bowel sounds, soft, nontender, no hepatosplenomegaly or other masses  M/S:   Gait and station antalgic. Left arm in splint. She is wearing [place] J collar, Digits and nails normal   SKIN:  Inspection/Palpation of skin and subcutaneous tissue ecchymosis over left hip to thigh.   NEURO: 2-12 in normal limits and at patient's baseline  PSYCH:  insight and judgement, memory intact , affect and mood normal    Recent Labs:   CBC RESULTS:   Recent Labs   Lab Test 05/04/18   WBC  8.5   RBC  3.56*   HGB  10.9*   HCT  32.8*   MCV  92   MCH  30.6   MCHC  33.2   RDW  15.6*   PLT  366       Last Basic Metabolic Panel:  Recent Labs   Lab Test 05/04/18   NA  135   POTASSIUM  3.9   CHLORIDE  102   MARQUEZ  8.6   CO2  23   BUN  17   CR  0.93   GLC  104*   2/6/18- Chest CT- follow up on pulmonary nodule   IMPRESSION:  1. Subtle enlargement of the now 9 x 6 mm right middle lobe pulmonary nodule.  2. " Likely post infectious or post inflammatory new scar or chronic atelectasis right upper lobe inferiorly abutting the pleural margin across the minor fissure.    Assessment/Plan:  (R07.2) Precordial pain  (primary encounter diagnosis)  Comment: episode of substernal chest pain yesterday that lasted for many hours. She did manage to go to sleep but woke up at 4:30 am. She took some acetaminophen and went back to sleep. This am she feel fine. VSS.  No dyspnea, no N, or reflux. DDX: stress vs angina vs pulmonary issue,   Plan: will watch for now. If more sx then might send to ED for evaluation.     (S12.091D) Closed nondisplaced fracture of seventh cervical vertebra with routine healing, unspecified fracture morphology, subsequent encounter  Comment: sustained after fall down steps. Wearing [place] J collar.   Plan: acetaminophen for pain, physical therapy   Activity restrictions are:  1. Wear [place]-j collar at all times, except when eating or showering.   2. No weight bearing of the left upper extremity   3. Wear the splint on the left wrist, do not remove it, completely cover the splint when showering.   [facility]: Appointment with Dr Prashant Bains scheduled for 5/24/18.    (R95.409D) Closed fracture of left wrist with routine healing, subsequent encounter  Comment: non surgical repair. Splint in place. NWF to left wrist   Plan: follow up with ortho in one week. to assess healing and obtain x-rays to assess fracture site   [facility] Date: Thur May 10th Time:1015am Location:[address]     (S70.02XD) Hematoma of left hip, subsequent encounter  Comment: due to being anticoagulated at time of fall. She does have burning pain. She is able to bear wt.   Plan: physical therapy, acetaminphen    (D62) Acute blood loss anemia  Comment: due to hematoma on hip. hgb stable at 10s.   Plan: hgb next week.     (I26.99) Other pulmonary embolism without acute cor pulmonale, unspecified chronicity  (H)  Comment: warfarin on hold   Plan: restart warfarin on 5/11    (I10) Essential hypertension  Comment: nursing has been checking BPs in leg and readings have been elevated. When checked in left arm then BP normal   Plan: use left arm for BPS    (Z85.3) HX: breast cancer  Comment: recent lumpectomy and sentinal node biopsy  Plan: continue plan of care    (R53.81) Physical deconditioning  Comment: due to fall with fractures.   Plan: physical therapy and OCCUPATIONAL THERAPY         Electronically signed by  CECY Dobbins CNP                      Sincerely,        CECY Dobbins CNP

## 2018-05-11 ENCOUNTER — NURSING HOME VISIT (OUTPATIENT)
Dept: GERIATRICS | Facility: CLINIC | Age: 83
End: 2018-05-11
Payer: COMMERCIAL

## 2018-05-11 VITALS
HEIGHT: 62 IN | WEIGHT: 165.6 LBS | BODY MASS INDEX: 30.47 KG/M2 | SYSTOLIC BLOOD PRESSURE: 143 MMHG | TEMPERATURE: 98.7 F | RESPIRATION RATE: 16 BRPM | HEART RATE: 106 BPM | OXYGEN SATURATION: 97 % | DIASTOLIC BLOOD PRESSURE: 89 MMHG

## 2018-05-11 DIAGNOSIS — Z79.01 ANTICOAGULATION MANAGEMENT ENCOUNTER: ICD-10-CM

## 2018-05-11 DIAGNOSIS — S62.102D CLOSED FRACTURE OF LEFT WRIST WITH ROUTINE HEALING, SUBSEQUENT ENCOUNTER: ICD-10-CM

## 2018-05-11 DIAGNOSIS — S70.02XD HEMATOMA OF LEFT HIP, SUBSEQUENT ENCOUNTER: ICD-10-CM

## 2018-05-11 DIAGNOSIS — I26.99 OTHER PULMONARY EMBOLISM WITHOUT ACUTE COR PULMONALE, UNSPECIFIED CHRONICITY (H): ICD-10-CM

## 2018-05-11 DIAGNOSIS — Z85.3 HX: BREAST CANCER: ICD-10-CM

## 2018-05-11 DIAGNOSIS — Z51.81 ANTICOAGULATION MANAGEMENT ENCOUNTER: ICD-10-CM

## 2018-05-11 DIAGNOSIS — D62 ACUTE BLOOD LOSS ANEMIA: ICD-10-CM

## 2018-05-11 DIAGNOSIS — I10 ESSENTIAL HYPERTENSION: ICD-10-CM

## 2018-05-11 DIAGNOSIS — R07.2 PRECORDIAL PAIN: ICD-10-CM

## 2018-05-11 DIAGNOSIS — R53.81 PHYSICAL DECONDITIONING: ICD-10-CM

## 2018-05-11 DIAGNOSIS — S12.601D CLOSED NONDISPLACED FRACTURE OF SEVENTH CERVICAL VERTEBRA WITH ROUTINE HEALING, UNSPECIFIED FRACTURE MORPHOLOGY, SUBSEQUENT ENCOUNTER: Primary | ICD-10-CM

## 2018-05-11 PROCEDURE — 99316 NF DSCHRG MGMT 30 MIN+: CPT | Performed by: NURSE PRACTITIONER

## 2018-05-11 RX ORDER — POLYETHYLENE GLYCOL 3350 17 G/17G
17 POWDER, FOR SOLUTION ORAL DAILY PRN
COMMUNITY

## 2018-05-11 RX ORDER — WARFARIN SODIUM 1 MG/1
1 TABLET ORAL DAILY
Qty: 30 TABLET | Refills: 0 | Status: SHIPPED | OUTPATIENT
Start: 2018-05-11

## 2018-05-11 NOTE — LETTER
5/11/2018        RE: Christina Naqvi  [address] [facility] [address]          [place] [facility] DISCHARGE SUMMARY    PATIENT'S NAME: Christina Naqvi  YOB: 1931  MEDICAL RECORD NUMBER:  [id number]    PRIMARY CARE PROVIDER AND CLINIC RESPONSIBLE AFTER TRANSFER: Amelia Hurtado [facility] [address] [facility]     CODE STATUS/ADVANCE DIRECTIVES DISCUSSION:   CPR/Full code        Allergies   Allergen Reactions     Codeine Nausea     Ampicillin Rash     Penicillins Rash       TRANSFERRING PROVIDERS: CECY Dobbins CNP, Jarrod Bell MD  DATE OF SNF ADMISSION:  May / 01 / 2018  DATE OF SNF (anticipated) DISCHARGE: May / 14 / 2018  DISCHARGE DISPOSITION: Non-FMG Provider   Nursing Facility: [facility]  stay 04/27/2018 to 05/01/2018.     Condition on Discharge:  Improving.  Function:  Needs minimal assist with ADLs  Cognitive Scores: CPT 4.8/5.6    Equipment: no aids    DISCHARGE DIAGNOSIS:   1. Precordial pain    2. Closed nondisplaced fracture of seventh cervical vertebra with routine healing, unspecified fracture morphology, subsequent encounter    3. Closed fracture of left wrist with routine healing, subsequent encounter    4. Hematoma of left hip, subsequent encounter    5. Acute blood loss anemia    6. Other pulmonary embolism without acute cor pulmonale, unspecified chronicity (H)    7. Anticoagulation management encounter    8. Essential hypertension    9. HX: breast cancer    10. Physical deconditioning        HPI Nursing Facility Course:  HPI information obtained from: facility chart records, facility staff, patient report, [facility] chart review and Care Everywhere [facility] chart review.  Current issues are:          Closed nondisplaced fracture of seventh cervical vertebra with routine healing, unspecified fracture morphology, subsequent encounter  Closed fracture of left  wrist with routine healing, subsequent encounter  Hematoma of left hip, subsequent encounter  Patient transferred to TCU from [facility] [place] after a fall at home. Patient fell down her basement stairs. She was anticoagulated at the time of the fall. She did strike her head. HCT was negative for bleeding.  She sustained a C7 vertebral body fracture, head laceration and left wrist fracture. She was placed in a [place] J collar, which she should wear at all times and a short arm splint. She also sustained a large left hip hematoma.   She is walking with a cane. She has a burning sensation at sight of left hip hematoma but does not want anything than acetaminophen stronger at this point.   She did return to ortho on 5/10 and had new cast placed on left wrist.     Acute blood loss anemia  hgb dropped from 14 to 10.2.   Hemoglobin   Date Value Ref Range Status   05/04/2018 10.9 (A) 11.7 - 15.7 g/dL Final   ]  Other pulmonary embolism without acute cor pulmonale, unspecified chronicity (H)  Anticoagulation management encounter  S/p two PULMONARY EMBOLI in the past: 2009.   Coumadin on hold until 5/11.  Previous coumadin dose was 1.5mg on MWF and 1mg on TThSS. We will restart coumadin and ask home care to check INR on 5/16.    Hypertension  BP's: 143/89, 127/77, 154/85 no change in meds. Nursing has been checking BP in her legs due to h/o mastectomy and lymph node removal on right and cast on left wrist.   Pre cordial pain- during TCU stay patient had retro sternal pain that went through to her back. The pain lasted about a day. It was relieved with acetaminophen. . She does not have the pain now. She denies any nausea, reflux, diaphoresis. She denies any pain radiating to jaw or left arm. She has no shortness of breath. She has not had this pain before. She does not have a cardiologist.   Records indicate episode of CP 3 years ago- ecg normal. She has had PULMONARY EMBOLISM and she was recently treated for Breast cancer.   HX: breast  cancer  Patient was diagnosed with a right breast infiltrating ductal carcinoma,III,ER+ [place] + H2N - breast cancer in 8/21/17. She had lumpectomy and SLN bx. On arimidex at home.     Physical deconditioning- lives alone in house. Has children in the area.   Has been independent. Therapy is going to work on car transfers today.     PAST MEDICAL HISTORY:  has a past medical history of Antiplatelet or antithrombotic long-term use; Coronary artery disease; Diverticulitis; Diverticulosis of colon (without mention of haemorrhage); Endometriosis; Personal history of colonic polyps; Pulmonary emboli (H); Pure hypercholesterolemia; Unspecified essential hypertension; and Unspecified glaucoma.    DISCHARGE MEDICATIONS:  Current Outpatient Prescriptions   Medication Sig Dispense Refill     ACETAMINOPHEN PO Take 650 mg by mouth 3 times daily       AMLODIPINE BESYLATE PO Take 5 mg by mouth daily.       ANASTROZOLE PO Take 1 mg by mouth daily       Ezetimibe (ZETIA PO) Take 10 mg by mouth At Bedtime.       LISINOPRIL PO Take 40 mg by mouth daily       polyethylene glycol (MIRALAX/GLYCOLAX) Packet Take 17 g by mouth daily as needed for constipation       Psyllium (METAMUCIL FIBER PO) Take 1 packet by mouth daily       QUETIAPINE FUMARATE PO Take 12.5 mg by mouth nightly as needed.       ROSUVASTATIN CALCIUM PO Take 5 mg by mouth three times a week Monday, Wednesday, friday       sennosides (SENOKOT) 8.6 MG tablet Take 2 tablets by mouth 2 times daily as needed for constipation       COENZYME Q-10 PO Take 200 mg by mouth daily        OMEGA-3 FATTY ACIDS PO Take 1,200 mg by mouth daily         MEDICATION CHANGES/RATIONALE:   5/7/18 DISCONTINUE senna. Start Senna s I BID and miralax 17g q day prn  Controlled medications sent with patient:   not applicable/none     ROS:    4 point ROS including Respiratory, CV, GI and , other than that noted in the HPI,  is negative    Physical Exam:   Vitals: /89  Pulse 106  Temp 98.7  F  "(37.1  C)  Resp 16  Ht 5' 2\" (1.575 m)  Wt 165 lb 9.6 oz (75.1 kg)  SpO2 97%  BMI 30.29 kg/m2  BMI= Body mass index is 30.29 kg/(m^2).    GENERAL APPEARANCE:  Alert, in no distress  ENT:  Mouth and posterior oropharynx normal, moist mucous membranes, hearing acuity adequate   EYES:  EOM, conjunctivae, lids, pupils and irises normal  RESP:  respiratory effort and palpation of chest normal, no respiratory distress, Lung sounds clear  CV:  Palpation and auscultation of heart done , rate and rhythm reg, no murmur, no rub or gallop, Edema none  ABDOMEN:  normal bowel sounds, soft, nontender, no hepatosplenomegaly or other masses  M/S:   Gait and station antalgic. Left arm in splint. She is wearing [place] J collar, Digits and nails normal   SKIN:  Inspection/Palpation of skin and subcutaneous tissue ecchymosis over left hip to thigh.   NEURO: 2-12 in normal limits and at patient's baseline  PSYCH:  insight and judgement, memory intact , affect and mood normal    DISCHARGE PLAN:  Occupational Therapy, Physical Therapy, Registered Nurse, Home Health Aide and From:  [facility] Care  Patient instructed to follow-up with:  PCP in 7 days      Current [place] scheduled appointments:  No future appointments.    MTM referral needed and placed by this provider: No    Pending labs: none  SNF labs   CBC RESULTS:   Recent Labs   Lab Test 05/04/18   WBC  8.5   RBC  3.56*   HGB  10.9*   HCT  32.8*   MCV  92   MCH  30.6   MCHC  33.2   RDW  15.6*   PLT  366       Last Basic Metabolic Panel:  Recent Labs   Lab Test 05/04/18   NA  135   POTASSIUM  3.9   CHLORIDE  102   MARQUEZ  8.6   CO2  23   BUN  17   CR  0.93   GLC  104*     Discharge Treatments:keep cast dry. NWB to LUE    TOTAL DISCHARGE TIME:   Greater than 30 minutes  Electronically signed by:  CECY Dobbins CNP      Sincerely,        CECY Dobbins CNP    "

## 2018-05-11 NOTE — PROGRESS NOTES
[place] [facility] DISCHARGE SUMMARY    PATIENT'S NAME: Christina Naqvi  YOB: 1931  MEDICAL RECORD NUMBER:  [id number]    PRIMARY CARE PROVIDER AND CLINIC RESPONSIBLE AFTER TRANSFER: Amelia Hurtado [facility] [address] / [place]     CODE STATUS/ADVANCE DIRECTIVES DISCUSSION:   CPR/Full code        Allergies   Allergen Reactions     Codeine Nausea     Ampicillin Rash     Penicillins Rash       TRANSFERRING PROVIDERS: CECY Dobbins CNP, Jarrod Bell MD  DATE OF SNF ADMISSION:  May / 01 / 2018  DATE OF SNF (anticipated) DISCHARGE: May / 14 / 2018  DISCHARGE DISPOSITION: Non-FMG Provider   Nursing Facility: [facility]  stay 04/27/2018 to 05/01/2018.     Condition on Discharge:  Improving.  Function:  Needs minimal assist with ADLs  Cognitive Scores: CPT 4.8/5.6    Equipment: no aids    DISCHARGE DIAGNOSIS:   1. Precordial pain    2. Closed nondisplaced fracture of seventh cervical vertebra with routine healing, unspecified fracture morphology, subsequent encounter    3. Closed fracture of left wrist with routine healing, subsequent encounter    4. Hematoma of left hip, subsequent encounter    5. Acute blood loss anemia    6. Other pulmonary embolism without acute cor pulmonale, unspecified chronicity (H)    7. Anticoagulation management encounter    8. Essential hypertension    9. HX: breast cancer    10. Physical deconditioning        HPI Nursing Facility Course:  HPI information obtained from: facility chart records, facility staff, patient report, [facility] chart review and Care Everywhere [facility] chart review.  Current issues are:          Closed nondisplaced fracture of seventh cervical vertebra with routine healing, unspecified fracture morphology, subsequent encounter  Closed fracture of left wrist with routine healing, subsequent encounter  Hematoma of left hip, subsequent encounter  Patient  transferred to TCU from [facility] after a fall at home. Patient fell down her basement stairs. She was anticoagulated at the time of the fall. She did strike her head. HCT was negative for bleeding.  She sustained a C7 vertebral body fracture, head laceration and left wrist fracture. She was placed in a [place] J collar, which she should wear at all times and a short arm splint. She also sustained a large left hip hematoma.   She is walking with a cane. She has a burning sensation at sight of left hip hematoma but does not want anything than acetaminophen stronger at this point.   She did return to ortho on 5/10 and had new cast placed on left wrist.     Acute blood loss anemia  hgb dropped from 14 to 10.2.   Hemoglobin   Date Value Ref Range Status   05/04/2018 10.9 (A) 11.7 - 15.7 g/dL Final   ]  Other pulmonary embolism without acute cor pulmonale, unspecified chronicity (H)  Anticoagulation management encounter  S/p two PULMONARY EMBOLI in the past: 2009.   Coumadin on hold until 5/11.  Previous coumadin dose was 1.5mg on MWF and 1mg on TThSS. We will restart coumadin and ask home care to check INR on 5/16.    Hypertension  BP's: 143/89, 127/77, 154/85 no change in meds. Nursing has been checking BP in her legs due to h/o mastectomy and lymph node removal on right and cast on left wrist.   Pre cordial pain- during TCU stay patient had retro sternal pain that went through to her back. The pain lasted about a day. It was relieved with acetaminophen. . She does not have the pain now. She denies any nausea, reflux, diaphoresis. She denies any pain radiating to jaw or left arm. She has no shortness of breath. She has not had this pain before. She does not have a cardiologist.   Records indicate episode of CP 3 years ago- ecg normal. She has had PULMONARY EMBOLISM and she was recently treated for Breast cancer.   HX: breast cancer  Patient was diagnosed with a right breast infiltrating ductal carcinoma,III,ER+ [place] + H2N  "- breast cancer in 8/21/17. She had lumpectomy and SLN bx. On arimidex at home.     Physical deconditioning- lives alone in house. Has children in the area.   Has been independent. Therapy is going to work on car transfers today.     PAST MEDICAL HISTORY:  has a past medical history of Antiplatelet or antithrombotic long-term use; Coronary artery disease; Diverticulitis; Diverticulosis of colon (without mention of haemorrhage); Endometriosis; Personal history of colonic polyps; Pulmonary emboli (H); Pure hypercholesterolemia; Unspecified essential hypertension; and Unspecified glaucoma.    DISCHARGE MEDICATIONS:  Current Outpatient Prescriptions   Medication Sig Dispense Refill     ACETAMINOPHEN PO Take 650 mg by mouth 3 times daily       AMLODIPINE BESYLATE PO Take 5 mg by mouth daily.       ANASTROZOLE PO Take 1 mg by mouth daily       Ezetimibe (ZETIA PO) Take 10 mg by mouth At Bedtime.       LISINOPRIL PO Take 40 mg by mouth daily       polyethylene glycol (MIRALAX/GLYCOLAX) Packet Take 17 g by mouth daily as needed for constipation       Psyllium (METAMUCIL FIBER PO) Take 1 packet by mouth daily       QUETIAPINE FUMARATE PO Take 12.5 mg by mouth nightly as needed.       ROSUVASTATIN CALCIUM PO Take 5 mg by mouth three times a week Monday, Wednesday, friday       sennosides (SENOKOT) 8.6 MG tablet Take 2 tablets by mouth 2 times daily as needed for constipation       COENZYME Q-10 PO Take 200 mg by mouth daily        OMEGA-3 FATTY ACIDS PO Take 1,200 mg by mouth daily         MEDICATION CHANGES/RATIONALE:   5/7/18 DISCONTINUE senna. Start Senna s I BID and miralax 17g q day prn  Controlled medications sent with patient:   not applicable/none     ROS:    4 point ROS including Respiratory, CV, GI and , other than that noted in the HPI,  is negative    Physical Exam:   Vitals: /89  Pulse 106  Temp 98.7  F (37.1  C)  Resp 16  Ht 5' 2\" (1.575 m)  Wt 165 lb 9.6 oz (75.1 kg)  SpO2 97%  BMI 30.29 " kg/m2  BMI= Body mass index is 30.29 kg/(m^2).    GENERAL APPEARANCE:  Alert, in no distress  ENT:  Mouth and posterior oropharynx normal, moist mucous membranes, hearing acuity adequate   EYES:  EOM, conjunctivae, lids, pupils and irises normal  RESP:  respiratory effort and palpation of chest normal, no respiratory distress, Lung sounds clear  CV:  Palpation and auscultation of heart done , rate and rhythm reg, no murmur, no rub or gallop, Edema none  ABDOMEN:  normal bowel sounds, soft, nontender, no hepatosplenomegaly or other masses  M/S:   Gait and station antalgic. Left arm in splint. She is wearing [place] J collar, Digits and nails normal   SKIN:  Inspection/Palpation of skin and subcutaneous tissue ecchymosis over left hip to thigh.   NEURO: 2-12 in normal limits and at patient's baseline  PSYCH:  insight and judgement, memory intact , affect and mood normal    DISCHARGE PLAN:  Occupational Therapy, Physical Therapy, Registered Nurse, Home Health Aide and From:  [facility] Care  Patient instructed to follow-up with:  PCP in 7 days      Current [place] scheduled appointments:  No future appointments.    MTM referral needed and placed by this provider: No    Pending labs: none  SNF labs   CBC RESULTS:   Recent Labs   Lab Test 05/04/18   WBC  8.5   RBC  3.56*   HGB  10.9*   HCT  32.8*   MCV  92   MCH  30.6   MCHC  33.2   RDW  15.6*   PLT  366       Last Basic Metabolic Panel:  Recent Labs   Lab Test 05/04/18   NA  135   POTASSIUM  3.9   CHLORIDE  102   MARQUEZ  8.6   CO2  23   BUN  17   CR  0.93   GLC  104*     Discharge Treatments:keep cast dry. NWB to LUE    TOTAL DISCHARGE TIME:   Greater than 30 minutes  Electronically signed by:  CECY Dobbins CNP

## 2018-05-25 PROCEDURE — 85610 PROTHROMBIN TIME: CPT

## 2018-05-29 LAB
INR PPP: 1.85 (ref 0.86–1.14)
INR PPP: 2.47 (ref 0.86–1.14)

## 2018-05-29 PROCEDURE — 85610 PROTHROMBIN TIME: CPT | Performed by: INTERNAL MEDICINE

## 2018-10-02 ENCOUNTER — HOSPITAL ENCOUNTER (OUTPATIENT)
Facility: CLINIC | Age: 83
Discharge: HOME | End: 2018-10-02
Attending: OPHTHALMOLOGY | Admitting: OPHTHALMOLOGY
Payer: MEDICARE

## 2018-10-02 ENCOUNTER — SURGERY (OUTPATIENT)
Age: 83
End: 2018-10-02

## 2018-10-02 VITALS
TEMPERATURE: 98 F | RESPIRATION RATE: 16 BRPM | DIASTOLIC BLOOD PRESSURE: 73 MMHG | OXYGEN SATURATION: 100 % | SYSTOLIC BLOOD PRESSURE: 131 MMHG

## 2018-10-02 PROCEDURE — 25000125 ZZHC RX 250: Performed by: OPHTHALMOLOGY

## 2018-10-02 PROCEDURE — 65855 TRABECULOPLASTY LASER SURG: CPT | Performed by: OPHTHALMOLOGY

## 2018-10-02 RX ORDER — PROPARACAINE HYDROCHLORIDE 5 MG/ML
1 SOLUTION/ DROPS OPHTHALMIC ONCE
Status: COMPLETED | OUTPATIENT
Start: 2018-10-02 | End: 2018-10-02

## 2018-10-02 RX ORDER — PILOCARPINE HYDROCHLORIDE 10 MG/ML
1 SOLUTION/ DROPS OPHTHALMIC ONCE
Status: COMPLETED | OUTPATIENT
Start: 2018-10-02 | End: 2018-10-02

## 2018-10-02 RX ADMIN — PROPARACAINE HYDROCHLORIDE 1 DROP: 5 SOLUTION/ DROPS OPHTHALMIC at 10:04

## 2018-10-02 RX ADMIN — APRACLONIDINE HYDROCHLORIDE 1 DROP: 10 SOLUTION/ DROPS OPHTHALMIC at 10:08

## 2018-10-02 RX ADMIN — PILOCARPINE HYDROCHLORIDE 1 DROP: 10 SOLUTION/ DROPS OPHTHALMIC at 10:04

## 2018-10-02 NOTE — OP NOTE
Pre-op diagnosis: Ocular hypertension, Right eye.  Post-op diagnosis: Same.  Procedure: Selective laser trabeculopolasty, Right eye.  Surgeon: Abdi Tilley MD.  Anesthesia: tetracaine.    Description: Pilocarpine 1% and apraclonidine 1% instilled.   Power:  1.0-2.0 mJ 122 shots 360 degrees.    Post op pressure to be checked in 1 hour - I am to be notified if IOP is above 25, and we will see the patient back in 1 week - otherwise patient was instructed to continue glaucoma medications until the scheduled 6 week visit.    Recheck sooner prn.

## 2022-07-21 RX ORDER — TIMOLOL MALEATE 5 MG/ML
SOLUTION/ DROPS OPHTHALMIC
Qty: 15 ML | OUTPATIENT
Start: 2022-07-21

## (undated) RX ORDER — PILOCARPINE HYDROCHLORIDE 10 MG/ML
SOLUTION/ DROPS OPHTHALMIC
Status: DISPENSED
Start: 2018-10-02